# Patient Record
Sex: FEMALE | Race: WHITE | NOT HISPANIC OR LATINO | Employment: FULL TIME | URBAN - METROPOLITAN AREA
[De-identification: names, ages, dates, MRNs, and addresses within clinical notes are randomized per-mention and may not be internally consistent; named-entity substitution may affect disease eponyms.]

---

## 2020-02-07 ENCOUNTER — OFFICE VISIT - RIVER FALLS (OUTPATIENT)
Dept: FAMILY MEDICINE | Facility: CLINIC | Age: 20
End: 2020-02-07

## 2020-02-07 ASSESSMENT — MIFFLIN-ST. JEOR: SCORE: 1892.55

## 2020-02-19 ENCOUNTER — OFFICE VISIT - RIVER FALLS (OUTPATIENT)
Dept: FAMILY MEDICINE | Facility: CLINIC | Age: 20
End: 2020-02-19

## 2020-02-19 ENCOUNTER — COMMUNICATION - RIVER FALLS (OUTPATIENT)
Dept: FAMILY MEDICINE | Facility: CLINIC | Age: 20
End: 2020-02-19

## 2020-02-19 LAB — DEPRECATED S PYO AG THROAT QL EIA: NOT DETECTED

## 2020-02-19 ASSESSMENT — MIFFLIN-ST. JEOR: SCORE: 1272.3

## 2022-02-11 VITALS
DIASTOLIC BLOOD PRESSURE: 70 MMHG | HEIGHT: 64 IN | DIASTOLIC BLOOD PRESSURE: 58 MMHG | SYSTOLIC BLOOD PRESSURE: 96 MMHG | HEART RATE: 84 BPM | OXYGEN SATURATION: 96 % | BODY MASS INDEX: 8.67 KG/M2 | HEART RATE: 121 BPM | HEIGHT: 72 IN | WEIGHT: 118 LBS | TEMPERATURE: 98.2 F | OXYGEN SATURATION: 99 % | TEMPERATURE: 99.6 F | BODY MASS INDEX: 20.14 KG/M2 | WEIGHT: 64 LBS | SYSTOLIC BLOOD PRESSURE: 102 MMHG

## 2022-02-16 NOTE — PROGRESS NOTES
Chief Complaint    Sore throat and fever.  History of Present Illness      Chief complaint as above reviewed and confirmed with patient.  Pt presents to the clinic with concerns re: sore throat.  Sx present for the last 1 day, moderate to severe ST, fever.  Able to swallow saliva but painful.  No vomiting or diarrhea. Ongoing rhinorrhea, congestion and cough not improved by use of Augmentin for sinusitis 2 weeks ago.  No sob or difficulty breathing. No rash.  No known exposures.       Pt frustrated by chronic cough since thanksgiving.  Improved some and then worse prior to treatment with augmentin, does not feel it was helpful. No facial pain, tooth pain, wheezing.  Using claritin which she does not feel is helpful.  No known allergies.  Ongoing rhinorrhea, congestion that is mild.  some PND.  No chest pain.  Pt lives in the dorms. No tobacco, no vaping. No reflux.   Review of Systems      Review of systems is negative with the exception of those noted in HPI          Physical Exam   Vitals & Measurements    T: 99.6   F (Tympanic)  HR: 121(Peripheral)  BP: 102/70  SpO2: 99%     HT: 63.5 in  WT: 118 lb  BMI: 20.57           Vitals as above per nursing documentation           Constitutional : nad appears well          Ears: ears patent B, TMS intact, noninjected           Nose: nasal mucosa is non-ededmatous. no discharge           Throat: pharynx is erythematous, 2+  tonsillar hypertrophy on the L,  1 + on the R. , small amount of exudate, uvula midline.           Neck: neck supple, small posterior cervical  adenopathy,, moderate anterior cervical adenopathy, no thyromegaly, no rigidity           Lungs: lungs CTA', no Wheezes, rhonchi or rales           Heart: heart RRR, nl S1, S2 no murmur           skin:  No rashes            RST:  negative   Assessment/Plan       1. Tonsillitis (J03.90)         given severity of sx will treat with keflex for nonstrep tonsillitis. If not improving over the next week consider  recheck and testing for mono but sx present only 1 day thus will hold off on testing today.  If worsening, not able to swallow saliva or trismus should return to clinic. NO signs of abscess at this time.                2. Chronic cough (R05)        she is already on Claritin, add albuterol trial prn and flonase.  Pt agreeable with plan, she is frustrated with duration of sx.  reassess in 2-3 weeks                Orders:         albuterol, 2 puff(s), Inhale, q4 hrs, # 17 gm, 0 Refill(s), Type: Maintenance, Pharmacy: [facility] #[id number], 2 puff(s) Inhale q4 hrs, (Ordered)         cephalexin, = 1 cap(s) ( 500 mg ), Oral, qid, x 10 day(s), # 40 cap(s), 0 Refill(s), Type: Acute, Pharmacy: [facility] #[id number], 1 cap(s) Oral qid,x10 day(s), (Ordered)         fluticasone nasal, = 2 spray(s), Nasal, daily, # 16 gm, 11 Refill(s), Type: Maintenance, Pharmacy: [facility] #[id number], 2 spray(s) Nasal daily,x30 day(s), (Ordered)         POC, GROUP A STREP* ([facility]), Specimen Type: Swab, Collection Date: 02/19/20 14:47:00 CST  Patient Information     Name:LUI DESAI      Address:      [address] [place] [id number]     Sex:Female     YOB: 2000     [phone]     Emergency Contact:JAMIA DESAI     MRN:[id number]     FIN:[id number]     Location:[facility]     Date of Service:02/19/2020      Primary Care Physician:       NONE ,       Attending Physician:       David ZAPATA, Humaira EDMONDSON, [phone]  Problem List/Past Medical History    Ongoing     No qualifying data    Historical     No qualifying data  Medications    albuterol 90 mcg/inh inhalation aerosol, 2 puff(s), Inhale, q4 hrs    Flonase 50 mcg/inh nasal spray, 2 spray(s), Nasal, daily, 11 refills    Keflex 500 mg oral capsule, 500 mg= 1 cap(s), Oral, qid    Tri-Linyah 35 mcg oral tablet  Allergies    No Known Medication Allergies  Social History    Smoking Status - 02/19/2020     Never  smoker     Alcohol      Never, 02/11/2020     Employment/School      Student, 02/11/2020     Exercise      Exercise frequency: 3-4 times/week. Exercise type: Weight lifting., 02/11/2020     Home/Environment      Marital status: Single. Living situation: Home/Independent. Injuries/Abuse/Neglect in household: No. Feels unsafe at home: No. Family/Friends available for support: Yes., 02/11/2020     Nutrition/Health      Type of diet: Regular. Wants to lose weight: No. Sleeping concerns: No. Feels highly stressed: No., 02/11/2020     Sexual      Sexually active: Yes. Identifies as female, Sexual orientation: Straight or heterosexual. History of STD: No. Uses condoms: Yes. Contraceptive Use Details: Birth control pill. History of sexual abuse: No., 02/11/2020     Substance Abuse      Never, 02/11/2020     Tobacco      Never (less than 100 in lifetime), 02/11/2020  Family History    Heart disease: Grandparent.    High blood pressure: Grandparent.    High cholesterol: Grandparent.  Immunizations      Vaccine Date Status          influenza (LAIV) 11/04/2013 Recorded          influenza (LAIV) 01/10/2013 Recorded          influenza (LAIV) 12/29/2010 Recorded          influenza (LAIV) 12/21/2009 Recorded          influenza (LAIV) 11/19/2009 Recorded  Lab Results       Lab Results (Last 4 results within 90 days)        Group A Strep POC: NOT DETECTED (02/19/20 15:03:00)

## 2022-02-16 NOTE — NURSING NOTE
Comprehensive Intake Entered On:  2/7/2020 3:45 PM CST    Performed On:  2/7/2020 3:40 PM CST by Joanie Camejo LPN               Summary   Chief Complaint :   cough since Thanksgiving - somewhat resolved but back again, very irritating, loud, does cough up mucus, sore throat but thinks it is more the cough   Weight Measured :   64 lb(Converted to: 64 lb 0 oz, 29.03 kg)    Height Measured :   118 in(Converted to: 9 ft 10 in, 299.72 cm)    Body Mass Index :   3.23 kg/m2 (LOW)    Body Surface Area :   1.55 m2   Systolic Blood Pressure :   96 mmHg   Diastolic Blood Pressure :   58 mmHg (LOW)    Mean Arterial Pressure :   71 mmHg   Peripheral Pulse Rate :   84 bpm   BP Site :   Right arm   BP Method :   Manual   Temperature Tympanic :   98.2 DegF(Converted to: 36.8 DegC)    Oxygen Saturation :   96 %   Joanie Camejo LPN - 2/7/2020 3:40 PM CST   Health Status   Allergies Verified? :   Yes   Medication History Verified? :   Yes   Medical History Verified? :   No   Pre-Visit Planning Status :   Not completed   Tobacco Use? :   Never smoker   Joanie Camejo LPN - 2/7/2020 3:40 PM CST   Meds / Allergies   (As Of: 2/7/2020 3:45:27 PM CST)   Allergies (Active)   No Known Medication Allergies  Estimated Onset Date:   Unspecified ; Created By:   Joanie Camejo LPN; Reaction Status:   Active ; Category:   Drug ; Substance:   No Known Medication Allergies ; Type:   Allergy ; Updated By:   Joanie Camejo LPN; Reviewed Date:   2/7/2020 3:42 PM CST        Medication List   (As Of: 2/7/2020 3:45:27 PM CST)   Home Meds    Miscellaneous Prescription  :   Miscellaneous Prescription ; Status:   Documented ; Ordered As Mnemonic:   Birtth Control Pill ; Simple Display Line:   Oral, daily, 0 Refill(s) ; Catalog Code:   Miscellaneous Prescription ; Order Dt/Tm:   2/7/2020 3:42:49 PM CST

## 2022-02-16 NOTE — PROGRESS NOTES
Patient:   LUI DESAI            MRN: [id number]            FIN: [id number]               Age:   19 years     Sex:  Female     :  2000   Associated Diagnoses:   Acute sinusitis   Author:   Hetal Crawley      Visit Information      Date of Service: 2020 03:27 pm  Performing Location: [facility]  Encounter#: [id number]      Primary Care Provider (PCP):  NONE ,       Referring Provider:  Hetal Crawley    NPI# [id number]      Chief Complaint   2020 3:40 PM CST     cough since Thanksgi - somewhat resolved but back again, very irritating, loud, does cough up mucus, sore throat but thinks it is more the cough        History of Present Illness   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 She thought it was a cold, went home a [place] and 'shared it' with family. Everyone else has since recovered except her. She has sinus congestion a nd a little sore throat.  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[employer] and hay triggers her cough but this is not new, she has been around had her whole life  She denies hx of asthma, vaping or smoking, no irritants    PMH--no hospitalizations no surgeries      Health Status   Allergies:    Allergic Reactions (Selected)  No Known Medication Allergies   Medications:  (Selected)   Prescriptions  Prescribed  amoxicillin-clavulanate 875 mg-125 mg oral tablet: = 1 tab(s), Oral, q12 hrs, x 7 day(s), # 14 tab(s), 0 Refill(s), Type: Acute, Pharmacy: [facility] #[id number], 1 tab(s) Oral q12 hrs,x7 day(s)  Documented Medications  Documented  Birtth Control Pill: Birtth Control Pill, Oral, daily, 0 Refill(s), Type: Maintenance   Problem list:    No problem items selected or recorded.      Histories   Past Medical History:    No active or resolved past medical history items have been selected or recorded.   Family History:    No family  history items have been selected or recorded.   Procedure history:    No active procedure history items have been selected or recorded.   Social History:             No active social history items have been recorded.      Physical Examination   Vital Signs   2/7/2020 3:40 PM CST Temperature Tympanic 98.2 DegF    Peripheral Pulse Rate 84 bpm    Systolic Blood Pressure 96 mmHg    Diastolic Blood Pressure 58 mmHg  LOW    Mean Arterial Pressure 71 mmHg    BP Site Right arm    BP Method Manual    Oxygen Saturation 96 %      Measurements from flowsheet : Measurements   2/7/2020 3:40 PM CST Height Measured - Standard 118 in    Weight Measured - Standard 64 lb    BSA 1.55 m2    Body Mass Index 3.23 kg/m2  LOW      General:  Alert and oriented, No acute distress.    Eye:  Normal conjunctiva.    HENT:  Tympanic membranes are clear, Normal hearing, Oral mucosa is moist, No pharyngeal erythema.    Neck:  Supple, Non-tender, No lymphadenopathy.    Respiratory:  Lungs are clear to auscultation, Respirations are non-labored, Breath sounds are equal, Symmetrical chest wall expansion.    Cardiovascular:  Normal rate, Regular rhythm, No murmur.    Gastrointestinal:  Soft, Non-tender, Non-distended.    Musculoskeletal:  Normal range of motion, Normal gait.    Integumentary:  Warm, Dry, Pink, No rash.    Neurologic:  Alert, Oriented.    Psychiatric:  Cooperative.       Impression and Plan   Diagnosis     Acute sinusitis ([id number] J01.90).     Patient Instructions:       Counseled: Patient, Regarding diagnosis, Regarding treatment, Regarding medications, Verbalized understanding, Counseled on symptomatic management. Return to clinic for re evaluation if worsening, simply not improving, or failure to resolve.   .    Orders     loratadine  saline nasal spray and antibiotic  rtc if not resolved.

## 2022-02-16 NOTE — NURSING NOTE
Depression Screening Entered On:  2/7/2020 4:41 PM CST    Performed On:  2/7/2020 4:40 PM CST by Joanie Camejo LPN               Depression Screening   Little Interest - Pleasure in Activities :   Not at all   Feeling Down, Depressed, Hopeless :   Not at all   Initial Depression Screen Score :   0    Trouble Falling or Staying Asleep :   Not at all   Feeling Tired or Little Energy :   Not at all   Poor Appetite or Overeating :   Not at all   Feeling Bad About Yourself :   Not at all   Trouble Concentrating :   Not at all   Moving or Speaking Slowly :   Not at all   Thoughts Better Off Dead or Hurting Self :   Not at all   Detailed Depression Screen Score :   0    Total Depression Screen Score :   0    Depression Screening Comment :   denies alcohol use   Joanie Camejo LPN - 2/7/2020 4:40 PM CST

## 2022-02-16 NOTE — NURSING NOTE
Comprehensive Intake Entered On:  2/19/2020 2:56 PM CST    Performed On:  2/19/2020 2:52 PM CST by Kim Rodriguez               Summary   Chief Complaint :   Sore throat and fever.    Weight Measured :   118 lb(Converted to: 118 lb 0 oz, 53.52 kg)    Height Measured :   63.5 in(Converted to: 5 ft 3 in, 161.29 cm)    Body Mass Index :   20.57 kg/m2   Body Surface Area :   1.55 m2   Systolic Blood Pressure :   102 mmHg   Diastolic Blood Pressure :   70 mmHg   Mean Arterial Pressure :   81 mmHg   Peripheral Pulse Rate :   121 bpm (HI)    Temperature Tympanic :   99.6 DegF(Converted to: 37.6 DegC)    Oxygen Saturation :   99 %   Kim Rodriguez - 2/19/2020 2:52 PM CST   Health Status   Allergies Verified? :   Yes   Medication History Verified? :   Yes   Medical History Verified? :   Yes   Pre-Visit Planning Status :   Completed   Tobacco Use? :   Never smoker   Kim Rodriguez - 2/19/2020 2:52 PM CST   Meds / Allergies   (As Of: 2/19/2020 2:56:04 PM CST)   Allergies (Active)   No Known Medication Allergies  Estimated Onset Date:   Unspecified ; Created By:   Joanie Camejo LPN; Reaction Status:   Active ; Category:   Drug ; Substance:   No Known Medication Allergies ; Type:   Allergy ; Updated By:   Joanie Camejo LPN; Reviewed Date:   2/19/2020 2:55 PM CST        Medication List   (As Of: 2/19/2020 2:56:04 PM CST)   Home Meds    Miscellaneous Prescription  :   Miscellaneous Prescription ; Status:   Documented ; Ordered As Mnemonic:   Birtth Control Pill ; Simple Display Line:   Oral, daily, 0 Refill(s) ; Catalog Code:   Miscellaneous Prescription ; Order Dt/Tm:   2/7/2020 3:42:49 PM CST

## 2022-06-20 ENCOUNTER — HOSPITAL ENCOUNTER (OUTPATIENT)
Dept: MRI IMAGING | Facility: CLINIC | Age: 22
Discharge: HOME | End: 2022-06-20
Attending: PHYSICIAN ASSISTANT | Admitting: PHYSICIAN ASSISTANT
Payer: COMMERCIAL

## 2022-06-20 DIAGNOSIS — M54.50 ACUTE LOW BACK PAIN: ICD-10-CM

## 2022-06-20 PROCEDURE — 72148 MRI LUMBAR SPINE W/O DYE: CPT

## 2022-06-25 ENCOUNTER — HEALTH MAINTENANCE LETTER (OUTPATIENT)
Age: 22
End: 2022-06-25

## 2022-08-26 ENCOUNTER — TRANSCRIBE ORDERS (OUTPATIENT)
Dept: OTHER | Age: 22
End: 2022-08-26

## 2022-08-26 DIAGNOSIS — M51.26 HERNIATION OF LEFT SIDE OF L4-L5 INTERVERTEBRAL DISC: Primary | ICD-10-CM

## 2022-09-12 ENCOUNTER — HOSPITAL ENCOUNTER (OUTPATIENT)
Dept: PHYSICAL THERAPY | Facility: CLINIC | Age: 22
Setting detail: THERAPIES SERIES
Discharge: HOME | End: 2022-09-12
Attending: PHYSICIAN ASSISTANT
Payer: COMMERCIAL

## 2022-09-12 DIAGNOSIS — M51.26 HERNIATION OF LEFT SIDE OF L4-L5 INTERVERTEBRAL DISC: ICD-10-CM

## 2022-09-12 PROCEDURE — 97161 PT EVAL LOW COMPLEX 20 MIN: CPT | Mod: GP | Performed by: PHYSICAL THERAPIST

## 2022-09-12 PROCEDURE — 97110 THERAPEUTIC EXERCISES: CPT | Mod: GP | Performed by: PHYSICAL THERAPIST

## 2022-09-12 NOTE — PROGRESS NOTES
09/12/22 1500   General Information   Type of Visit Initial OP Ortho PT Evaluation   Start of Care Date 09/12/22   Referring Physician Attila Amos PA-C   Patient/Family Goals Statement return to lifting at the gym   Orders Evaluate and Treat   Date of Order 08/26/22   Certification Required? No   Medical Diagnosis Herniation of left side of L4-L5 intervertebral disc (M51.26)   Body Part(s)   Body Part(s) Lumbar Spine/SI   Presentation and Etiology   Pertinent history of current problem (include personal factors and/or comorbidities that impact the POC) Pt reported deadlifting at the gym when the injury occurred.  The pt's cousin is a chiropractor and gave the pt various other exercises. MRI shows L sided posterior disc bulge.   Impairments A. Pain;F. Decreased strength and endurance;D. Decreased ROM   Functional Limitations perform activities of daily living;perform desired leisure / sports activities   Symptom Location low back at L5-S1   How/Where did it occur While lifting   Onset date of current episode/exacerbation 05/02/22   Chronicity New   Pain rating (0-10 point scale) Best (/10);Worst (/10)   Best (/10) 1   Worst (/10) 7   Pain quality B. Dull;E. Shooting   Frequency of pain/symptoms B. Intermittent   Pain/symptoms are: Worse during the day   Pain/symptoms exacerbated by A. Sitting;C. Lifting;G. Certain positions   Pain/symptoms eased by B. Walking;H. Cold   Progression of symptoms since onset: Improved   Prior Level of Function   Functional Level Prior Comment Pt was fully functioning without limitations   Current Level of Function   Patient role/employment history A. Employed   Employment Comments    Fall Risk Screen   Fall screen completed by PT   Have you fallen 2 or more times in the past year? No   Have you fallen and had an injury in the past year? No   Is patient a fall risk? No   Lumbar Spine/SI Objective Findings   Balance/Proprioception (Single Leg Stance) 30 sec holds    Piriformis Flexibility tight on L   Flexion ROM WFL- shifts to the R   Extension ROM WFL   Right Side Bending ROM WFL   Left Side Bending ROM WFL   Transversus Abdominus Strength (Jair Leg Lowering-deg) 2   Hip Flexion (L2) Strength 5   Hip Abduction Strength 4+   Hip Extension Strength 4   Knee Flexion Strength 5   Knee Extension (L3) Strength 5   SLR -   Prone Instability Test -   Repeated Extension Prone -   Spring Test -   Slump Test -   Observation squats- tightness noted in the L hip with hip hinge and squats, compensation with rotation to the R   Posture L toeing out   Planned Therapy Interventions   Planned Therapy Interventions balance training;gait training;joint mobilization;manual therapy;neuromuscular re-education;ROM;strengthening;stretching   Planned Modality Interventions   Planned Modality Interventions Electrical stimulation;TENS;Traction;Ultrasound   Clinical Impression   Criteria for Skilled Therapeutic Interventions Met yes, treatment indicated   PT Diagnosis low back pain without radiating Sx   Influenced by the following impairments weakness, L hip tightness with limited ROM, muscle imbalance and compensation   Functional limitations due to impairments lifting, sitting, workouts   Clinical Presentation Stable/Uncomplicated   Clinical Presentation Rationale current condition, pain reduced without radiating Sx at this time   Clinical Decision Making (Complexity) Low complexity   Therapy Frequency 1 time/week   Predicted Duration of Therapy Intervention (days/wks) 8   Risk & Benefits of therapy have been explained Yes   Patient, Family & other staff in agreement with plan of care Yes   Clinical Impression Comments good prognosis. Pt is avid gym goer and wishes to return to lifting but is somewhat apprehensive of the motion at this time due to worrying about reinjurying herself   ORTHO GOALS   PT Ortho Eval Goals 1;2;3;4   Ortho Goal 1   Goal Identifier ST goal   Goal Description Pt will have  - tightness in the piriformis to allow full mobility in the hip with squats in 4 weeks.   Target Date 10/10/22   Ortho Goal 2   Goal Identifier LT strength goal   Goal Description Pt will have 5/5 hip strength to stabilize the trunk and support the low back in 8 weeks.   Target Date 11/07/22   Ortho Goal 3   Goal Identifier LT strength goal   Goal Description Pt will have 3/ core strength and stability to suport the back when lifting in 8 weeks.   Target Date 11/07/22   Ortho Goal 4   Goal Identifier LT goal   Goal Description Pt will be independent in home strengthening program for self management of Sx in 8 weeks.   Target Date 11/07/22   Total Evaluation Time   PT Eval, Low Complexity Minutes ([id number]) 15       Caterina Finnegan, PT, DPT

## 2022-09-26 ENCOUNTER — HOSPITAL ENCOUNTER (OUTPATIENT)
Dept: PHYSICAL THERAPY | Facility: CLINIC | Age: 22
Setting detail: THERAPIES SERIES
Discharge: HOME | End: 2022-09-26
Attending: PHYSICIAN ASSISTANT
Payer: COMMERCIAL

## 2022-09-26 PROCEDURE — 97110 THERAPEUTIC EXERCISES: CPT | Mod: GP | Performed by: PHYSICAL THERAPIST

## 2022-10-10 ENCOUNTER — HOSPITAL ENCOUNTER (OUTPATIENT)
Dept: PHYSICAL THERAPY | Facility: CLINIC | Age: 22
Setting detail: THERAPIES SERIES
Discharge: HOME | End: 2022-10-10
Attending: PHYSICIAN ASSISTANT
Payer: COMMERCIAL

## 2022-10-10 PROCEDURE — 97110 THERAPEUTIC EXERCISES: CPT | Mod: GP | Performed by: PHYSICAL THERAPIST

## 2022-10-17 ENCOUNTER — HOSPITAL ENCOUNTER (OUTPATIENT)
Dept: PHYSICAL THERAPY | Facility: CLINIC | Age: 22
Setting detail: THERAPIES SERIES
Discharge: HOME | End: 2022-10-17
Attending: PHYSICIAN ASSISTANT
Payer: COMMERCIAL

## 2022-10-17 PROCEDURE — 97110 THERAPEUTIC EXERCISES: CPT | Mod: GP | Performed by: PHYSICAL THERAPIST

## 2022-10-17 NOTE — PROGRESS NOTES
[facility]    Outpatient Physical Therapy Discharge Note  Patient: Juanis Yusuf  : 2000    Beginning/End Dates of Reporting Period:  22 to 10/17/22    Referring Provider: Attila Amos PA-C    Therapy Diagnosis: low back pain without radiating Sx     Client Self Report: Pt notes no tightness in the hip nor pain to the area. She noted overall doing well and looking to return to the gym in the next month or so.    Objective Measurements:  Objective Measure: MMT  Details: TA- 3/5;  hip abd- 5/5; ER- 5/5; IR- 5/5  Objective Measure: - ROMI         Goals:  Goal Identifier ST goal   Goal Description Pt will have - tightness in the piriformis to allow full mobility in the hip with squats in 4 weeks.   Target Date 10/10/22   Date Met  10/17/22   Progress (detail required for progress note):       Goal Identifier LT strength goal   Goal Description Pt will have 5/5 hip strength to stabilize the trunk and support the low back in 8 weeks.   Target Date 22   Date Met  10/17/22   Progress (detail required for progress note):       Goal Identifier LT strength goal   Goal Description Pt will have 3/ core strength and stability to suport the back when lifting in 8 weeks.   Target Date 22   Date Met  10/17/22   Progress (detail required for progress note):       Goal Identifier LT goal   Goal Description Pt will be independent in home strengthening program for self management of Sx in 8 weeks.   Target Date 22   Date Met  10/17/22   Progress (detail required for progress note):         Plan:  Discharge from therapy.    Discharge:    Reason for Discharge: Patient has met all goals.    Equipment Issued: none    Discharge Plan: Patient to continue home program.

## 2022-11-20 ENCOUNTER — HEALTH MAINTENANCE LETTER (OUTPATIENT)
Age: 22
End: 2022-11-20

## 2023-07-08 ENCOUNTER — HEALTH MAINTENANCE LETTER (OUTPATIENT)
Age: 23
End: 2023-07-08

## 2023-08-01 ASSESSMENT — ENCOUNTER SYMPTOMS
COUGH: 0
ARTHRALGIAS: 0
HEMATOCHEZIA: 0
DIZZINESS: 0
HEARTBURN: 0
WEAKNESS: 0
FREQUENCY: 0
DYSURIA: 0
DIARRHEA: 0
ABDOMINAL PAIN: 0
CHILLS: 0
NERVOUS/ANXIOUS: 0
EYE PAIN: 0
PALPITATIONS: 0
JOINT SWELLING: 0
NAUSEA: 0
CONSTIPATION: 0
BREAST MASS: 0
FEVER: 0
SORE THROAT: 0
PARESTHESIAS: 0
HEADACHES: 0
MYALGIAS: 0
HEMATURIA: 0
SHORTNESS OF BREATH: 0

## 2023-08-07 PROBLEM — Z97.5 IUD (INTRAUTERINE DEVICE) IN PLACE: Status: ACTIVE | Noted: 2021-01-22

## 2023-08-07 PROBLEM — N94.6 DYSMENORRHEA: Status: ACTIVE | Noted: 2017-09-25

## 2023-08-07 PROBLEM — N92.0 MENORRHAGIA WITH REGULAR CYCLE: Status: ACTIVE | Noted: 2017-09-25

## 2023-08-07 PROBLEM — L70.9 ACNE: Status: ACTIVE | Noted: 2017-09-25

## 2023-08-07 RX ORDER — TOBRAMYCIN 3 MG/ML
SOLUTION/ DROPS OPHTHALMIC
COMMUNITY
Start: 2022-06-17 | End: 2023-08-08

## 2023-08-08 ENCOUNTER — OFFICE VISIT (OUTPATIENT)
Dept: FAMILY MEDICINE | Facility: CLINIC | Age: 23
End: 2023-08-08
Payer: COMMERCIAL

## 2023-08-08 VITALS
DIASTOLIC BLOOD PRESSURE: 72 MMHG | SYSTOLIC BLOOD PRESSURE: 110 MMHG | BODY MASS INDEX: 20.53 KG/M2 | OXYGEN SATURATION: 100 % | WEIGHT: 120.25 LBS | HEART RATE: 86 BPM | HEIGHT: 64 IN | RESPIRATION RATE: 16 BRPM | TEMPERATURE: 98.8 F

## 2023-08-08 DIAGNOSIS — B00.1 COLD SORE: ICD-10-CM

## 2023-08-08 DIAGNOSIS — R55 SYNCOPE, UNSPECIFIED SYNCOPE TYPE: ICD-10-CM

## 2023-08-08 DIAGNOSIS — Z12.4 CERVICAL CANCER SCREENING: ICD-10-CM

## 2023-08-08 DIAGNOSIS — Z00.00 ROUTINE GENERAL MEDICAL EXAMINATION AT A HEALTH CARE FACILITY: ICD-10-CM

## 2023-08-08 DIAGNOSIS — Z11.3 SCREENING FOR STDS (SEXUALLY TRANSMITTED DISEASES): Primary | ICD-10-CM

## 2023-08-08 LAB
ALBUMIN SERPL BCG-MCNC: 4.6 G/DL (ref 3.5–5.2)
ALP SERPL-CCNC: 57 U/L (ref 35–104)
ALT SERPL W P-5'-P-CCNC: 17 U/L (ref 0–50)
ANION GAP SERPL CALCULATED.3IONS-SCNC: 12 MMOL/L (ref 7–15)
AST SERPL W P-5'-P-CCNC: 22 U/L (ref 0–45)
BILIRUB SERPL-MCNC: 0.3 MG/DL
BUN SERPL-MCNC: 10.6 MG/DL (ref 6–20)
CALCIUM SERPL-MCNC: 10 MG/DL (ref 8.6–10)
CHLORIDE SERPL-SCNC: 102 MMOL/L (ref 98–107)
CREAT SERPL-MCNC: 0.91 MG/DL (ref 0.51–0.95)
DEPRECATED HCO3 PLAS-SCNC: 25 MMOL/L (ref 22–29)
ERYTHROCYTE [DISTWIDTH] IN BLOOD BY AUTOMATED COUNT: 13.1 % (ref 10–15)
GFR SERPL CREATININE-BSD FRML MDRD: 90 ML/MIN/1.73M2
GLUCOSE SERPL-MCNC: 93 MG/DL (ref 70–99)
HBA1C MFR BLD: 4.8 % (ref 0–5.6)
HCT VFR BLD AUTO: 39.3 % (ref 35–47)
HGB BLD-MCNC: 12.9 G/DL (ref 11.7–15.7)
MCH RBC QN AUTO: 29.1 PG (ref 26.5–33)
MCHC RBC AUTO-ENTMCNC: 32.8 G/DL (ref 31.5–36.5)
MCV RBC AUTO: 89 FL (ref 78–100)
PLATELET # BLD AUTO: 256 10E3/UL (ref 150–450)
POTASSIUM SERPL-SCNC: 4.2 MMOL/L (ref 3.4–5.3)
PROT SERPL-MCNC: 7.5 G/DL (ref 6.4–8.3)
RBC # BLD AUTO: 4.44 10E6/UL (ref 3.8–5.2)
SODIUM SERPL-SCNC: 139 MMOL/L (ref 136–145)
TSH SERPL DL<=0.005 MIU/L-ACNC: 3.82 UIU/ML (ref 0.3–4.2)
WBC # BLD AUTO: 6.7 10E3/UL (ref 4–11)

## 2023-08-08 PROCEDURE — 80053 COMPREHEN METABOLIC PANEL: CPT | Performed by: FAMILY MEDICINE

## 2023-08-08 PROCEDURE — G0145 SCR C/V CYTO,THINLAYER,RESCR: HCPCS | Performed by: FAMILY MEDICINE

## 2023-08-08 PROCEDURE — 83036 HEMOGLOBIN GLYCOSYLATED A1C: CPT | Performed by: FAMILY MEDICINE

## 2023-08-08 PROCEDURE — 87591 N.GONORRHOEAE DNA AMP PROB: CPT | Performed by: FAMILY MEDICINE

## 2023-08-08 PROCEDURE — G0124 SCREEN C/V THIN LAYER BY MD: HCPCS | Performed by: PATHOLOGY

## 2023-08-08 PROCEDURE — 85027 COMPLETE CBC AUTOMATED: CPT | Performed by: FAMILY MEDICINE

## 2023-08-08 PROCEDURE — 99214 OFFICE O/P EST MOD 30 MIN: CPT | Mod: 25 | Performed by: FAMILY MEDICINE

## 2023-08-08 PROCEDURE — 87491 CHLMYD TRACH DNA AMP PROBE: CPT | Performed by: FAMILY MEDICINE

## 2023-08-08 PROCEDURE — 36415 COLL VENOUS BLD VENIPUNCTURE: CPT | Performed by: FAMILY MEDICINE

## 2023-08-08 PROCEDURE — 99385 PREV VISIT NEW AGE 18-39: CPT | Performed by: FAMILY MEDICINE

## 2023-08-08 PROCEDURE — 84443 ASSAY THYROID STIM HORMONE: CPT | Performed by: FAMILY MEDICINE

## 2023-08-08 RX ORDER — VALACYCLOVIR HYDROCHLORIDE 1 G/1
2000 TABLET, FILM COATED ORAL 2 TIMES DAILY
Qty: 8 TABLET | Refills: 3 | Status: SHIPPED | OUTPATIENT
Start: 2023-08-08 | End: 2023-08-09

## 2023-08-08 ASSESSMENT — ENCOUNTER SYMPTOMS
CHILLS: 0
SHORTNESS OF BREATH: 0
JOINT SWELLING: 0
WEAKNESS: 0
CONSTIPATION: 0
ABDOMINAL PAIN: 0
MYALGIAS: 0
NAUSEA: 0
FREQUENCY: 0
COUGH: 0
BREAST MASS: 0
FEVER: 0
EYE PAIN: 0
NERVOUS/ANXIOUS: 0
SORE THROAT: 0
PALPITATIONS: 0
HEARTBURN: 0
HEMATOCHEZIA: 0
DYSURIA: 0
DIZZINESS: 0
DIARRHEA: 0
ARTHRALGIAS: 0
HEADACHES: 0
PARESTHESIAS: 0
HEMATURIA: 0

## 2023-08-08 ASSESSMENT — PATIENT HEALTH QUESTIONNAIRE - PHQ9
SUM OF ALL RESPONSES TO PHQ QUESTIONS 1-9: 4
SUM OF ALL RESPONSES TO PHQ QUESTIONS 1-9: 4
10. IF YOU CHECKED OFF ANY PROBLEMS, HOW DIFFICULT HAVE THESE PROBLEMS MADE IT FOR YOU TO DO YOUR WORK, TAKE CARE OF THINGS AT HOME, OR GET ALONG WITH OTHER PEOPLE: NOT DIFFICULT AT ALL

## 2023-08-08 NOTE — PROGRESS NOTES
SUBJECTIVE:   CC: Juanis is an 22 year old who presents for preventive health visit.     Healthy Habits:     Getting at least 3 servings of Calcium per day:  Yes    Bi-annual eye exam:  NO    Dental care twice a year:  Yes    Sleep apnea or symptoms of sleep apnea:  None    Diet:  Regular (no restrictions)    Frequency of exercise:  4-5 days/week    Duration of exercise:  45-60 minutes    Taking medications regularly:  Yes    Medication side effects:  Not applicable    Additional concerns today:  No    Answers submitted by the patient for this visit:  Patient Health Questionnaire (Submitted on 8/8/2023)  If you checked off any problems, how difficult have these problems made it for you to do your work, take care of things at home, or get along with other people?: Not difficult at all  PHQ9 TOTAL SCORE: 4      Today's PHQ-9 Score:       8/8/2023     3:09 PM   PHQ-9 SCORE   PHQ-9 Total Score MyChart 4 (Minimal depression)   PHQ-9 Total Score 4     Syncope: Patient had an episode in January where she was at the gym.  She finished her kickboxing class (which she had taken before) and had a episode of brief syncope.  She states that she had prodromal symptoms and was able to sit down and let other people know that she was about to faint.  She states that she came to fairly quickly and felt okay afterwards.  No seizure-like activity.  She states that she did not feel as though she was dehydrated.  She ate breakfast that morning.  This has not happened since that time.    HSV-1: Patient has a past medical history significant for HSV 1 which usually occurs in the fall on either her nose or by her mouth.  This was in and correctly treated for several years she stated however they did a swab last year which showed assessed HSV-1.  She is hopeful to get some treatment for this that she can have on hand.    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  She has a boyfriend who she has been dating for almost a year  and they are doing well.    Have you ever done Advance Care Planning? (For example, a Health Directive, POLST, or a discussion with a medical provider or your loved ones about your wishes): No, advance care planning information given to patient to review.  Patient plans to discuss their wishes with loved ones or provider.      Social History     Tobacco Use    Smoking status: Not on file    Smokeless tobacco: Not on file   Substance Use Topics    Alcohol use: Not on file             8/1/2023     3:59 PM   Alcohol Use   Prescreen: >3 drinks/day or >7 drinks/week? No     Reviewed orders with patient.  Reviewed health maintenance and updated orders accordingly - Yes  Lab work is in process    Breast Cancer Screening:        History of abnormal Pap smear: NO - age 21-29 PAP every 3 years recommended     Reviewed and updated as needed this visit by clinical staff    Allergies  Meds              Reviewed and updated as needed this visit by Provider                     Review of Systems   Constitutional:  Negative for chills and fever.   HENT:  Negative for congestion, ear pain, hearing loss and sore throat.    Eyes:  Negative for pain and visual disturbance.   Respiratory:  Negative for cough and shortness of breath.    Cardiovascular:  Negative for chest pain, palpitations and peripheral edema.   Gastrointestinal:  Negative for abdominal pain, constipation, diarrhea, heartburn, hematochezia and nausea.   Breasts:  Negative for tenderness, breast mass and discharge.   Genitourinary:  Negative for dysuria, frequency, genital sores, hematuria, pelvic pain, urgency, vaginal bleeding and vaginal discharge.   Musculoskeletal:  Negative for arthralgias, joint swelling and myalgias.   Skin:  Negative for rash.   Neurological:  Negative for dizziness, weakness, headaches and paresthesias.   Psychiatric/Behavioral:  Negative for mood changes. The patient is not nervous/anxious.           OBJECTIVE:   /72   Pulse 86    "Temp 98.8  F (37.1  C) (Tympanic)   Resp 16   Ht 1.63 m (5' 4.17\")   Wt 54.5 kg (120 lb 4 oz)   LMP  (LMP Unknown)   SpO2 100%   BMI 20.53 kg/m    Physical Exam    Physical Exam:  General Appearance: Alert, cooperative, no distress, appears stated age   Head: Normocephalic, without obvious abnormality, atraumatic  Eyes: PERRL, conjunctiva/corneas clear, EOM's intact   Ears: Normal TM's and external ear canals, both ears  Nose:Nares normal, septum midline,mucosa normal, no drainage    Throat:Lips, mucosa, and tongue normal; teeth and gums normal  Neck: Supple, symmetrical, trachea midline, no adenopathy;  thyroid: not enlarged, symmetric, no tenderness/mass/nodules  Back: Symmetric, no curvature, ROM normal,  Lungs: Clear to auscultation bilaterally, respirations unlabored  Breasts: No breast masses, tenderness, asymmetry, or nipple discharge.  Heart: Regular rate and rhythm, S1 and S2 normal, no murmur, rub, or gallop  Abdomen: Soft, non-tender, bowel sounds active all four quadrants,  no masses, no organomegaly  Pelvic:normal external female genitalia, normal appearing vaginal mucosa and cervix  Extremities: Extremities normal, atraumatic, no cyanosis or edema  Skin: Skin color, texture, turgor normal, no rashes or lesions  Lymph nodes: Cervical, supraclavicular, and axillary nodes normal and   Neurologic: Normal      Diagnostic Test Results:  Labs reviewed in Epic    ASSESSMENT/PLAN:   1. Routine general medical examination at a health care facility  - HEMOGLOBIN A1C; Future  - CBC with Platelets; Future  - HEMOGLOBIN A1C  - CBC with Platelets    2. Screening for STDs (sexually transmitted diseases)  - NEISSERIA GONORRHOEA PCR  - CHLAMYDIA TRACHOMATIS PCR    3. Cervical cancer screening  - Pap Screen only - recommended age 21 - 24 years    4. Cold sore  Valtrex sent to have on hand.  Discussed risk and benefits and when to use the medication.  - valACYclovir (VALTREX) 1000 mg tablet; Take 2 tablets (2,000 " mg) by mouth 2 times daily for 1 day  Dispense: 8 tablet; Refill: 3    5. Syncope, unspecified syncope type  Likely vasovagal.  Will order labs below.  Discussed good fluid intake.  She will let me know if this continues to happen at which point we should do further workup and monitoring.  - TSH with free T4 reflex; Future  - HEMOGLOBIN A1C; Future  - COMPREHENSIVE METABOLIC PANEL; Future  - CBC with Platelets; Future  - TSH with free T4 reflex  - HEMOGLOBIN A1C  - COMPREHENSIVE METABOLIC PANEL  - CBC with Platelets      Patient has been advised of split billing requirements and indicates understanding: Yes      COUNSELING:  Reviewed preventive health counseling, as reflected in patient instructions        She has no history on file for tobacco use.          Jing Neal MD  [facility]

## 2023-08-09 LAB
C TRACH DNA SPEC QL NAA+PROBE: NEGATIVE
N GONORRHOEA DNA SPEC QL NAA+PROBE: NEGATIVE

## 2023-08-14 ENCOUNTER — PATIENT OUTREACH (OUTPATIENT)
Dept: FAMILY MEDICINE | Facility: CLINIC | Age: 23
End: 2023-08-14
Payer: COMMERCIAL

## 2023-08-14 LAB
BKR LAB AP GYN ADEQUACY: ABNORMAL
BKR LAB AP GYN INTERPRETATION: ABNORMAL
BKR LAB AP HPV REFLEX: NO
BKR LAB AP PREVIOUS ABNORMAL: ABNORMAL
PATH REPORT.COMMENTS IMP SPEC: ABNORMAL
PATH REPORT.COMMENTS IMP SPEC: ABNORMAL
PATH REPORT.RELEVANT HX SPEC: ABNORMAL

## 2023-09-26 ENCOUNTER — TELEPHONE (OUTPATIENT)
Dept: FAMILY MEDICINE | Facility: CLINIC | Age: 23
End: 2023-09-26
Payer: COMMERCIAL
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
## 2023-09-26 NOTE — TELEPHONE ENCOUNTER
Hi - I think the risk for infectious disease would be very small but I would be happy to have a video visit with her over the next week if she would like (or she can see one of my partners)    EB

## 2024-07-22 ENCOUNTER — PATIENT OUTREACH (OUTPATIENT)
Dept: FAMILY MEDICINE | Facility: CLINIC | Age: 24
End: 2024-07-22
Payer: COMMERCIAL

## 2024-07-22 PROBLEM — R87.610 ATYPICAL SQUAMOUS CELLS OF UNDETERMINED SIGNIFICANCE (ASCUS) ON PAPANICOLAOU SMEAR OF CERVIX: Status: ACTIVE | Noted: 2023-08-08

## 2024-11-01 ENCOUNTER — OFFICE VISIT (OUTPATIENT)
Dept: FAMILY MEDICINE | Facility: CLINIC | Age: 24
End: 2024-11-01
Payer: COMMERCIAL

## 2024-11-01 VITALS
TEMPERATURE: 98.7 F | HEART RATE: 85 BPM | OXYGEN SATURATION: 99 % | DIASTOLIC BLOOD PRESSURE: 74 MMHG | BODY MASS INDEX: 19.7 KG/M2 | HEIGHT: 65 IN | RESPIRATION RATE: 16 BRPM | WEIGHT: 118.25 LBS | SYSTOLIC BLOOD PRESSURE: 112 MMHG

## 2024-11-01 DIAGNOSIS — R19.7 DIARRHEA, UNSPECIFIED TYPE: ICD-10-CM

## 2024-11-01 DIAGNOSIS — Z11.3 SCREENING FOR STDS (SEXUALLY TRANSMITTED DISEASES): ICD-10-CM

## 2024-11-01 DIAGNOSIS — B00.1 COLD SORE: ICD-10-CM

## 2024-11-01 DIAGNOSIS — Z00.00 ROUTINE GENERAL MEDICAL EXAMINATION AT A HEALTH CARE FACILITY: Primary | ICD-10-CM

## 2024-11-01 DIAGNOSIS — Z12.4 CERVICAL CANCER SCREENING: ICD-10-CM

## 2024-11-01 LAB
ALBUMIN SERPL BCG-MCNC: 4.5 G/DL (ref 3.5–5.2)
ALP SERPL-CCNC: 49 U/L (ref 40–150)
ALT SERPL W P-5'-P-CCNC: 15 U/L (ref 0–50)
ANION GAP SERPL CALCULATED.3IONS-SCNC: 12 MMOL/L (ref 7–15)
AST SERPL W P-5'-P-CCNC: 25 U/L (ref 0–45)
BILIRUB SERPL-MCNC: 0.5 MG/DL
BUN SERPL-MCNC: 12.3 MG/DL (ref 6–20)
CALCIUM SERPL-MCNC: 9.4 MG/DL (ref 8.8–10.4)
CHLORIDE SERPL-SCNC: 105 MMOL/L (ref 98–107)
CHOLEST SERPL-MCNC: 186 MG/DL
CREAT SERPL-MCNC: 0.91 MG/DL (ref 0.51–0.95)
EGFRCR SERPLBLD CKD-EPI 2021: 90 ML/MIN/1.73M2
ERYTHROCYTE [DISTWIDTH] IN BLOOD BY AUTOMATED COUNT: 12.7 % (ref 10–15)
FASTING STATUS PATIENT QL REPORTED: YES
FASTING STATUS PATIENT QL REPORTED: YES
GLUCOSE SERPL-MCNC: 91 MG/DL (ref 70–99)
HCO3 SERPL-SCNC: 23 MMOL/L (ref 22–29)
HCT VFR BLD AUTO: 38.9 % (ref 35–47)
HDLC SERPL-MCNC: 75 MG/DL
HGB BLD-MCNC: 12.8 G/DL (ref 11.7–15.7)
LDLC SERPL CALC-MCNC: 104 MG/DL
MCH RBC QN AUTO: 28.8 PG (ref 26.5–33)
MCHC RBC AUTO-ENTMCNC: 32.9 G/DL (ref 31.5–36.5)
MCV RBC AUTO: 88 FL (ref 78–100)
NONHDLC SERPL-MCNC: 111 MG/DL
PLATELET # BLD AUTO: 263 10E3/UL (ref 150–450)
POTASSIUM SERPL-SCNC: 4.2 MMOL/L (ref 3.4–5.3)
PROT SERPL-MCNC: 7.1 G/DL (ref 6.4–8.3)
RBC # BLD AUTO: 4.44 10E6/UL (ref 3.8–5.2)
SODIUM SERPL-SCNC: 140 MMOL/L (ref 135–145)
TRIGL SERPL-MCNC: 35 MG/DL
WBC # BLD AUTO: 4.5 10E3/UL (ref 4–11)

## 2024-11-01 PROCEDURE — 99459 PELVIC EXAMINATION: CPT | Performed by: FAMILY MEDICINE

## 2024-11-01 PROCEDURE — 80061 LIPID PANEL: CPT | Performed by: FAMILY MEDICINE

## 2024-11-01 PROCEDURE — 87591 N.GONORRHOEAE DNA AMP PROB: CPT | Performed by: FAMILY MEDICINE

## 2024-11-01 PROCEDURE — G0145 SCR C/V CYTO,THINLAYER,RESCR: HCPCS | Performed by: FAMILY MEDICINE

## 2024-11-01 PROCEDURE — 85027 COMPLETE CBC AUTOMATED: CPT | Performed by: FAMILY MEDICINE

## 2024-11-01 PROCEDURE — 99395 PREV VISIT EST AGE 18-39: CPT | Performed by: FAMILY MEDICINE

## 2024-11-01 PROCEDURE — 87491 CHLMYD TRACH DNA AMP PROBE: CPT | Performed by: FAMILY MEDICINE

## 2024-11-01 PROCEDURE — 36415 COLL VENOUS BLD VENIPUNCTURE: CPT | Performed by: FAMILY MEDICINE

## 2024-11-01 PROCEDURE — 80053 COMPREHEN METABOLIC PANEL: CPT | Performed by: FAMILY MEDICINE

## 2024-11-01 RX ORDER — VALACYCLOVIR HYDROCHLORIDE 1 G/1
2000 TABLET, FILM COATED ORAL 2 TIMES DAILY
Qty: 8 TABLET | Refills: 5 | Status: SHIPPED | OUTPATIENT
Start: 2024-11-01

## 2024-11-01 SDOH — HEALTH STABILITY: PHYSICAL HEALTH: ON AVERAGE, HOW MANY MINUTES DO YOU ENGAGE IN EXERCISE AT THIS LEVEL?: 60 MIN

## 2024-11-01 SDOH — HEALTH STABILITY: PHYSICAL HEALTH: ON AVERAGE, HOW MANY DAYS PER WEEK DO YOU ENGAGE IN MODERATE TO STRENUOUS EXERCISE (LIKE A BRISK WALK)?: 5 DAYS

## 2024-11-01 ASSESSMENT — SOCIAL DETERMINANTS OF HEALTH (SDOH): HOW OFTEN DO YOU GET TOGETHER WITH FRIENDS OR RELATIVES?: TWICE A WEEK

## 2024-11-01 NOTE — PROGRESS NOTES
Preventive Care Visit  [facility] RUIZ Neal MD, Family Medicine  Nov 1, 2024      Assessment & Plan     Routine general medical examination at a health care facility  - REVIEW OF HEALTH MAINTENANCE PROTOCOL ORDERS  - PRIMARY CARE FOLLOW-UP SCHEDULING; Future  - CBC with platelets; Future  - Comprehensive metabolic panel (BMP + Alb, Alk Phos, ALT, AST, Total. Bili, TP); Future  - Lipid panel reflex to direct LDL Fasting; Future    Cervical cancer screening  - Pap Screen Only - Recommended Age 21 - 24 Years    Screening for STDs (sexually transmitted diseases)  - Chlamydia trachomatis/Neisseria gonorrhoeae by PCR - Clinic Collect    Cold sore  refill  - valACYclovir (VALTREX) 1000 mg tablet; Take 2 tablets (2,000 mg) by mouth 2 times daily.    Diarrhea, unspecified type  Referral to GI placed.  Discussed trialing a probiotic for the next few months as well as keeping a calendar for symptoms.  - Adult GI  Referral - Consult Only; Future    Patient has been advised of split billing requirements and indicates understanding: Yes        Counseling  Appropriate preventive services were addressed with this patient via screening, questionnaire, or discussion as appropriate for fall prevention, nutrition, physical activity, Tobacco-use cessation, social engagement, weight loss and cognition.  Checklist reviewing preventive services available has been given to the patient.  Reviewed patient's diet, addressing concerns and/or questions.   She is at risk for psychosocial distress and has been provided with information to reduce risk.       MEDICATIONS:  Continue current medications without change    Scott Olivarez is a 23 year old, presenting for the following:  Physical (Pap smear and GC due- fasting for labs today)         HPI  She is overall doing well.  Her and her boyfriend recently moved to a new house and she is enjoying this.    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        Health Care Directive  Patient does not have a Health Care Directive: Discussed advance care planning with patient; however, patient declined at this time.      11/1/2024   General Health   How would you rate your overall physical health? Good   Feel stress (tense, anxious, or unable to sleep) Only a little      (!) STRESS CONCERN      11/1/2024   Nutrition   Three or more servings of calcium each day? Yes   Diet: Regular (no restrictions)   How many servings of fruit and vegetables per day? (!) 2-3   How many sweetened beverages each day? 0-1            11/1/2024   Exercise   Days per week of moderate/strenous exercise 5 days   Average minutes spent exercising at this level 60 min            11/1/2024   Social Factors   Frequency of gathering with friends or relatives Twice a week   Worry food won't last until get money to buy more No   Food not last or not have enough money for food? No   Do you have housing? (Housing is defined as stable permanent housing and does not include staying ouside in a car, in a tent, in an abandoned building, in an overnight shelter, or couch-surfing.) Yes   Are you worried about losing your housing? No   Lack of transportation? No   Unable to get utilities (heat,electricity)? No            11/1/2024   Dental   Dentist two times every year? Yes            11/1/2024   TB Screening   Were you born outside of the [place]? No            Today's PHQ-2 Score:       11/1/2024     8:02 AM   PHQ-2 ( 1999 [employer])   Q1: Little interest or pleasure in doing things 0    Q2: Feeling down, depressed or hopeless 0    PHQ-2 Score 0    Q1: Little interest or pleasure in doing things Not at all   Q2: Feeling down, depressed or hopeless Not at all   PHQ-2 Score 0       Patient-reported           11/1/2024   Substance Use   Alcohol more than 3/day or more than 7/wk No   Do you use any other substances recreationally? No                 11/1/2024   STI Screening   New sexual  "partner(s) since last STI/HIV test? No        History of abnormal Pap smear: No - age 21-29 PAP every 3 years recommended        8/8/2023     3:21 PM   PAP / HPV   PAP Atypical squamous cells of undetermined significance (ASC-US)            11/1/2024   Contraception/Family Planning   Questions about contraception or family planning No           Reviewed and updated as needed this visit by Provider                    No past medical history on file.      Review of Systems  Constitutional, HEENT, cardiovascular, pulmonary, GI, , musculoskeletal, neuro, skin, endocrine and psych systems are negative, except as otherwise noted.     Objective    Exam  /74   Pulse 85   Temp 98.7  F (37.1  C) (Tympanic)   Resp 16   Ht 1.638 m (5' 4.5\")   Wt 53.6 kg (118 lb 4 oz)   LMP 10/29/2024 (Exact Date)   SpO2 99%   BMI 19.98 kg/m     Estimated body mass index is 19.98 kg/m  as calculated from the following:    Height as of this encounter: 1.638 m (5' 4.5\").    Weight as of this encounter: 53.6 kg (118 lb 4 oz).    Physical Exam    Physical Exam:  General Appearance: Alert, cooperative, no distress, appears stated age   Head: Normocephalic, without obvious abnormality, atraumatic  Eyes: PERRL, conjunctiva/corneas clear, EOM's intact   Ears: Normal TM's and external ear canals, both ears  Nose:Nares normal, septum midline,mucosa normal, no drainage    Throat:Lips, mucosa, and tongue normal; teeth and gums normal  Neck: Supple, symmetrical, trachea midline, no adenopathy;  thyroid: not enlarged, symmetric, no tenderness/mass/nodules  Back: Symmetric, no curvature, ROM normal,  Lungs: Clear to auscultation bilaterally, respirations unlabored  Breasts: No breast masses, tenderness, asymmetry, or nipple discharge.  Heart: Regular rate and rhythm, S1 and S2 normal, no murmur, rub, or gallop  Abdomen: Soft, non-tender, bowel sounds active all four quadrants,  no masses, no organomegaly  Pelvic:normal external female " genitalia, normal appearing vaginal mucosa and cervix, IUD strings very short in cervix  Extremities: Extremities normal, atraumatic, no cyanosis or edema  Skin: Skin color, texture, turgor normal, no rashes or lesions  Lymph nodes: Cervical, supraclavicular, and axillary nodes normal and   Neurologic: Normal          Signed Electronically by: Jing Neal MD

## 2024-11-01 NOTE — PATIENT INSTRUCTIONS
Trial align probiotic    Patient Education   Preventive Care Advice   This is general advice given by our system to help you stay healthy. However, your care team may have specific advice just for you. Please talk to your care team about your preventive care needs.  Nutrition  Eat 5 or more servings of fruits and vegetables each day.  Try wheat bread, brown rice and whole grain pasta (instead of white bread, rice, and pasta).  Get enough calcium and vitamin D. Check the label on foods and aim for 100% of the RDA (recommended daily allowance).  Lifestyle  Exercise at least 150 minutes each week  (30 minutes a day, 5 days a week).  Do muscle strengthening activities 2 days a week. These help control your weight and prevent disease.  No smoking.  Wear sunscreen to prevent skin cancer.  Have a dental exam and cleaning every 6 months.  Yearly exams  See your health care team every year to talk about:  Any changes in your health.  Any medicines your care team has prescribed.  Preventive care, family planning, and ways to prevent chronic diseases.  Shots (vaccines)   HPV shots (up to age 26), if you've never had them before.  Hepatitis B shots (up to age 59), if you've never had them before.  COVID-19 shot: Get this shot when it's due.  Flu shot: Get a flu shot every year.  Tetanus shot: Get a tetanus shot every 10 years.  Pneumococcal, hepatitis A, and RSV shots: Ask your care team if you need these based on your risk.  Shingles shot (for age 50 and up)  General health tests  Diabetes screening:  Starting at age 35, Get screened for diabetes at least every 3 years.  If you are younger than age 35, ask your care team if you should be screened for diabetes.  Cholesterol test: At age 39, start having a cholesterol test every 5 years, or more often if advised.  Bone density scan (DEXA): At age 50, ask your care team if you should have this scan for osteoporosis (brittle bones).  Hepatitis C: Get tested at least once in your  life.  STIs (sexually transmitted infections)  Before age 24: Ask your care team if you should be screened for STIs.  After age 24: Get screened for STIs if you're at risk. You are at risk for STIs (including HIV) if:  You are sexually active with more than one person.  You don't use condoms every time.  You or a partner was diagnosed with a sexually transmitted infection.  If you are at risk for HIV, ask about PrEP medicine to prevent HIV.  Get tested for HIV at least once in your life, whether you are at risk for HIV or not.  Cancer screening tests  Cervical cancer screening: If you have a cervix, begin getting regular cervical cancer screening tests starting at age 21.  Breast cancer scan (mammogram): If you've ever had breasts, begin having regular mammograms starting at age 40. This is a scan to check for breast cancer.  Colon cancer screening: It is important to start screening for colon cancer at age 45.  Have a colonoscopy test every 10 years (or more often if you're at risk) Or, ask your provider about stool tests like a FIT test every year or Cologuard test every 3 years.  To learn more about your testing options, visit:   .  For help making a decision, visit:   https://bit.ly/hx30279.  Prostate cancer screening test: If you have a prostate, ask your care team if a prostate cancer screening test (PSA) at age 55 is right for you.  Lung cancer screening: If you are a current or former smoker ages 50 to 80, ask your care team if ongoing lung cancer screenings are right for you.  For informational purposes only. Not to replace the advice of your health care provider. Copyright   2023 [employer]. All rights reserved. Clinically reviewed by the [facility] Transitions Program. [employer] [id number] - REV 01/24.

## 2024-11-02 LAB
C TRACH DNA SPEC QL PROBE+SIG AMP: NEGATIVE
N GONORRHOEA DNA SPEC QL NAA+PROBE: NEGATIVE

## 2024-11-06 ENCOUNTER — PATIENT OUTREACH (OUTPATIENT)
Dept: FAMILY MEDICINE | Facility: CLINIC | Age: 24
End: 2024-11-06
Payer: COMMERCIAL

## 2024-11-06 LAB
BKR LAB AP GYN ADEQUACY: NORMAL
BKR LAB AP GYN INTERPRETATION: NORMAL
BKR LAB AP HPV REFLEX: NO
BKR LAB AP PREVIOUS ABNORMAL: NORMAL
PATH REPORT.COMMENTS IMP SPEC: NORMAL
PATH REPORT.COMMENTS IMP SPEC: NORMAL
PATH REPORT.RELEVANT HX SPEC: NORMAL

## 2025-01-15 ENCOUNTER — MYC REFILL (OUTPATIENT)
Dept: FAMILY MEDICINE | Facility: CLINIC | Age: 25
End: 2025-01-15
Payer: COMMERCIAL

## 2025-01-15 DIAGNOSIS — B00.1 COLD SORE: ICD-10-CM

## 2025-01-15 RX ORDER — VALACYCLOVIR HYDROCHLORIDE 1 G/1
2000 TABLET, FILM COATED ORAL 2 TIMES DAILY
Qty: 8 TABLET | Refills: 5 | Status: CANCELLED | OUTPATIENT
Start: 2025-01-15

## 2025-01-17 NOTE — TELEPHONE ENCOUNTER
Can you please contact this patient.  2 months ago she was given 8 tablets with 5 refills.  Has she needed all of that medication at this point?  If so we should have a discussion regarding a daily preventative treatment.  If she has not used the refill they should get the pharmacy.    Thanks    KELLI